# Patient Record
(demographics unavailable — no encounter records)

---

## 2024-10-15 NOTE — HISTORY OF PRESENT ILLNESS
[FreeTextEntry1] : Follow-up for discharge from [facility] for syncope and transient metabolic encephalopathy.  [de-identified] : Patient is a 74 year old female enrolled in the [facility] program with no PMHx. Patient was recently discharged from [facility] for syncope and transient metabolic encephalopathy.    Hospital chart reviewed and copied as per [facility] Discharge Summary: "73 yo F who was brought in from home due to episode of LOC and unresponsiveness. Per pt's daughter who, the pt was sitting at the kitchen table with her son when she slumped to the side and hit the back of her head on the table. Initially in ED she largely unresponsive, She would wiggle bilateral thumbs when asked to by her daughter. As neuro was considering IV thrombolytics Dr. Sena, was able to elicit a more cooperative exam after vigorous stimulation. Pt ultimately opened her eyes, smiled, moved BUE antigravity. Due to rapidly improving exam, without clear, lateralizing deficit, ultimately decided to forgo IV thrombolytics. CTA head/neck without ELVO, CTP negative".    Patient observed via tele visit and is alert and oriented x 3, in no acute distress. Patient states they are feeling well today. Patient states they are eating and drinking well. Patient reports she has been working with PT and has been advised to use a cane for assistance with ambulation. Reports compliance with medications. States she is currently wearing cardiac monitor that is due to be taken off 10/25/24 and will f/u with cardiology afterwards. Patient reports she has pending f/u with neurology- plans to call to make appointment. Denies any cough, fever, chills, abdominal pain, palpitations, nausea, vomiting, diarrhea, lightheadedness, dizziness, shortness of breath, or chest pain.

## 2024-10-15 NOTE — ASSESSMENT
[FreeTextEntry1] : Patient is a 74 year old female enrolled in the [facility] program with no PMHx. Patient was recently discharged from [facility] for syncope and transient metabolic encephalopathy.    Hospital chart reviewed and copied as per [facility] Discharge Summary: "73 yo F who was brought in from home due to episode of LOC and unresponsiveness. Per pt's daughter who, the pt was sitting at the kitchen table with her son when she slumped to the side and hit the back of her head on the table. Initially in ED she largely unresponsive, She would wiggle bilateral thumbs when asked to by her daughter. As neuro was considering IV thrombolytics Dr. Sena, was able to elicit a more cooperative exam after vigorous stimulation. Pt ultimately opened her eyes, smiled, moved BUE antigravity. Due to rapidly improving exam, without clear, lateralizing deficit, ultimately decided to forgo IV thrombolytics. CTA head/neck without ELVO, CTP negative".    Patient observed via tele visit and is alert and oriented x 3, in no acute distress. Patient states they are feeling well today. Patient states they are eating and drinking well. Patient reports she has been working with PT and has been advised to use a cane for assistance with ambulation. Reports compliance with medications. States she is currently wearing cardiac monitor that is due to be taken off 10/25/24 and will f/u with cardiology afterwards. Patient reports she has pending f/u with neurology- plans to call to make appointment. Denies any cough, fever, chills, abdominal pain, palpitations, nausea, vomiting, diarrhea, lightheadedness, dizziness, shortness of breath, or chest pain.

## 2024-10-15 NOTE — PHYSICAL EXAM
[No Acute Distress] : no acute distress [Well Nourished] : well nourished [Well Developed] : well developed [Well-Appearing] : well-appearing [Normal Sclera/Conjunctiva] : normal sclera/conjunctiva [Normal Outer Ear/Nose] : the outer ears and nose were normal in appearance [No Respiratory Distress] : no respiratory distress  [No Edema] : there was no peripheral edema [Non-distended] : non-distended [No Rash] : no rash [Normal Affect] : the affect was normal [Normal Insight/Judgement] : insight and judgment were intact [de-identified] : limited due to tele visit

## 2024-10-15 NOTE — REVIEW OF SYSTEMS
[Fever] : no fever [Chills] : no chills [Fatigue] : no fatigue [Hot Flashes] : no hot flashes [Night Sweats] : no night sweats [Chest Pain] : no chest pain [Palpitations] : no palpitations [Leg Claudication] : no leg claudication [Lower Ext Edema] : no lower extremity edema [Orthopnea] : no orthopnea [Shortness Of Breath] : no shortness of breath [Wheezing] : no wheezing [Cough] : no cough [Dyspnea on Exertion] : no dyspnea on exertion [Abdominal Pain] : no abdominal pain [Nausea] : no nausea [Constipation] : no constipation [Diarrhea] : diarrhea [Vomiting] : no vomiting [Dysuria] : no dysuria [Incontinence] : no incontinence [Hematuria] : no hematuria [Joint Pain] : no joint pain [Muscle Pain] : no muscle pain [Itching] : no itching [Skin Rash] : no skin rash [Dizziness] : dizziness [Unsteady Walking] : ataxia [Negative] : Heme/Lymph

## 2024-10-25 NOTE — END OF VISIT
[FreeTextEntry3] :  All medical record entries made by my scribe were at my, Dr. Faisal Garcia, direction and personally dictated by me. I have reviewed the chart and agree that the record accurately reflects my personal performance of the history, physical exam, assessment and plan. I have also personally directed, reviewed, and agreed with the chart.

## 2024-10-25 NOTE — PHYSICAL EXAM
[Well Developed] : well developed [Well Nourished] : well nourished [No Acute Distress] : no acute distress [Normal Venous Pressure] : normal venous pressure [No Carotid Bruit] : no carotid bruit [Normal S1, S2] : normal S1, S2 [No Murmur] : no murmur [No Rub] : no rub [No Gallop] : no gallop [Clear Lung Fields] : clear lung fields [Good Air Entry] : good air entry [No Respiratory Distress] : no respiratory distress  [Soft] : abdomen soft [Non Tender] : non-tender [No Masses/organomegaly] : no masses/organomegaly [Normal Bowel Sounds] : normal bowel sounds [Normal Gait] : normal gait [No Edema] : no edema [No Cyanosis] : no cyanosis [No Clubbing] : no clubbing [No Varicosities] : no varicosities [No Rash] : no rash [No Skin Lesions] : no skin lesions [Moves all extremities] : moves all extremities [No Focal Deficits] : no focal deficits [Normal Speech] : normal speech [Alert and Oriented] : alert and oriented [Normal memory] : normal memory [General Appearance - Well Developed] : well developed [Normal Appearance] : normal appearance [Well Groomed] : well groomed [General Appearance - Well Nourished] : well nourished [No Deformities] : no deformities [General Appearance - In No Acute Distress] : no acute distress [Normal Conjunctiva] : the conjunctiva exhibited no abnormalities [Eyelids - No Xanthelasma] : the eyelids demonstrated no xanthelasmas [Respiration, Rhythm And Depth] : normal respiratory rhythm and effort [Exaggerated Use Of Accessory Muscles For Inspiration] : no accessory muscle use [Auscultation Breath Sounds / Voice Sounds] : lungs were clear to auscultation bilaterally [Heart Rate And Rhythm] : heart rate and rhythm were normal [Heart Sounds] : normal S1 and S2 [Murmurs] : no murmurs present [Abdomen Soft] : soft [Abdomen Tenderness] : non-tender [Abdomen Mass (___ Cm)] : no abdominal mass palpated [Nail Clubbing] : no clubbing of the fingernails [Cyanosis, Localized] : no localized cyanosis [Petechial Hemorrhages (___cm)] : no petechial hemorrhages [] : no ischemic changes

## 2024-10-25 NOTE — ADDENDUM
[FreeTextEntry1] : Ana HEWITT assisted in documentation on 10/25/2024   acting as a scribe for Dr. Faisal Garcia.

## 2024-10-25 NOTE — HISTORY OF PRESENT ILLNESS
[FreeTextEntry1] : 73 yo F c PMHX arrythmia and palpitations, SBO, here with atypical chest pain- MI was ruled out with 3 sets of negative cardiac enzymes. telemetry demonstrating a lot of PAC's, patient likely has symptomatic palpitations from PAC's. a Coronary CTA was performed and patient has score of 0- very LOW  probability of having coronary disease.  10/25/2024: Patient comes to the office for follow-up after two years. Patient was recently hospitalized for episodes of syncope. Patient was sitting at a table talking to daughter and lost consciousness. Patient does not recall what happened, notes sudden onset, and did not wake up until she reached the hospital. Workup in the hospital did not show any significant abnormalities or stroke. Patient does not remember what happened and comes to my office today for evaluation. On event monitor, patient was found to have an episode of non-sustained VT.     EKG (10/25/2024): Sinus rhythm at 70 bpm  Echo (09/2024): EF of 53%, mildly dilated LA, moderate mitral valve prolapses MRI (09/2024): No acute intracranial pathology  EKG SR 65 bpm PAC event monitor - PAC and PVC CT - 3mm nodule

## 2024-10-25 NOTE — HISTORY OF PRESENT ILLNESS
[FreeTextEntry1] : 75 yo F c PMHX arrythmia and palpitations, SBO, here with atypical chest pain- MI was ruled out with 3 sets of negative cardiac enzymes. telemetry demonstrating a lot of PAC's, patient likely has symptomatic palpitations from PAC's. a Coronary CTA was performed and patient has score of 0- very LOW  probability of having coronary disease.  10/25/2024: Patient comes to the office for follow-up after two years. Patient was recently hospitalized for episodes of syncope. Patient was sitting at a table talking to daughter and lost consciousness. Patient does not recall what happened, notes sudden onset, and did not wake up until she reached the hospital. Workup in the hospital did not show any significant abnormalities or stroke. Patient does not remember what happened and comes to my office today for evaluation. On event monitor, patient was found to have an episode of non-sustained VT.     EKG (10/25/2024): Sinus rhythm at 70 bpm  Echo (09/2024): EF of 53%, mildly dilated LA, moderate mitral valve prolapses MRI (09/2024): No acute intracranial pathology  EKG SR 65 bpm PAC event monitor - PAC and PVC CT - 3mm nodule

## 2024-10-25 NOTE — ASSESSMENT
[FreeTextEntry1] : Palpitations  -improved  - cont BB  Nodular of CT - I informed patient to follow up with pulmonary to evaluate her CT scan.  Syncope - Unknown etiology of syncope at this time.   - Given long period of time for loss of consciousness, it is less likely to be cardiac in nature. However, patient does have mitral valve prolapse. Therefore, would like to rule out malignant mitral valve prolapse syndrome.  - Recommend cardiac catheterization. - MRI.  - ILR to evaluate for abnormal heart rhythm. Especially if patient has possible stroke, he will be able to rule out atrial fibrillation.    Non-sustained VT - Cardiac catheterization to evaluate for any new coronary disease.   - Patient had prior CT that was questionable. However, did not show any significant lesions.

## 2024-11-27 NOTE — ASSESSMENT
[FreeTextEntry1] : Palpitations s/p ILR implant  - Wound as described above. I recommend patient STAR keflex 500mg BID for infection prophylaxis. Picture shared with Dr. Garcia. He would like to see the patient in person to assess. She will go to [address] [place] [address] today as he is there seeing patients.  - Likely hematoma pushing up device. Will likely heal well - patient is wanting device removed. I explained that this can be done but site must be less inflamed and she must complete abx first. Can re-eval at follow up.  - No events on ILR. - The patient is on remote and transmitting.   Non-sustained VT - No new episodes.  - Cardiac catheterization to evaluate for any new coronary disease.   - Patient had prior CT that was questionable. However, did not show any significant lesions.   To follow up 12/6 as scheduled.

## 2024-11-27 NOTE — PROCEDURE
[See Device Printout] : See device printout [de-identified] : FRANCISCA [de-identified] : LINQ II [de-identified] : [id number] [de-identified] : 11/7/2024 [de-identified] : No events

## 2024-11-27 NOTE — HISTORY OF PRESENT ILLNESS
[FreeTextEntry1] : 75 yo F c PMHX arrythmia and palpitations, SBO, here with atypical chest pain- MI was ruled out with 3 sets of negative cardiac enzymes. telemetry demonstrating a lot of PAC's, patient likely has symptomatic palpitations from PAC's. a Coronary CTA was performed and patient has score of 0- very LOW  probability of having coronary disease.  10/25/2024: Patient comes to the office for follow-up after two years. Patient was recently hospitalized for episodes of syncope. Patient was sitting at a table talking to daughter and lost consciousness. Patient does not recall what happened, notes sudden onset, and did not wake up until she reached the hospital. Workup in the hospital did not show any significant abnormalities or stroke. Patient does not remember what happened and comes to my office today for evaluation. On event monitor, patient was found to have an episode of non-sustained VT.    11/21/2024: Patient presents for urgent follow up for WCK s/p ILR implant. She reports swelling and pain. ILR site was bleeding 3 days after implant.   EKG (10/25/2024): Sinus rhythm at 70 bpm  Echo (09/2024): EF of 53%, mildly dilated LA, moderate mitral valve prolapses MRI (09/2024): No acute intracranial pathology  EKG SR 65 bpm PAC event monitor - PAC and PVC CT - 3mm nodule

## 2024-11-27 NOTE — ASSESSMENT
[FreeTextEntry1] : Palpitations s/p ILR implant  - Wound as described above. I recommend patient STAR keflex 500mg BID for infection prophylaxis. Picture shared with Dr. Garcia. He would like to see the patient in person to assess. She will go to [address] [place] [address] today as he is there seeing patients.  - Likely hematoma pushing up device. Will likely heal well - patient is wanting device removed. I explained that this can be done but site must be less inflamed and she must complete abx first. Can re-eval at follow up.  - No events on ILR. - The patient is on remote and transmitting.   Non-sustained VT - No new episodes.  - Cardiac catheterization to evaluate for any new coronary disease.   - Patient had prior CT that was questionable. However, did not show any significant lesions.   To follow up 12/6 as scheduled.

## 2024-11-27 NOTE — PROCEDURE
[See Device Printout] : See device printout [de-identified] : FRANCISCA [de-identified] : LINQ II [de-identified] : [id number] [de-identified] : 11/7/2024 [de-identified] : No events

## 2024-11-27 NOTE — PHYSICAL EXAM
[Well Developed] : well developed [Well Nourished] : well nourished [No Acute Distress] : no acute distress [Normal Venous Pressure] : normal venous pressure [No Carotid Bruit] : no carotid bruit [Normal S1, S2] : normal S1, S2 [No Murmur] : no murmur [No Rub] : no rub [No Gallop] : no gallop [Clear Lung Fields] : clear lung fields [Good Air Entry] : good air entry [No Respiratory Distress] : no respiratory distress  [Soft] : abdomen soft [Non Tender] : non-tender [No Masses/organomegaly] : no masses/organomegaly [Normal Bowel Sounds] : normal bowel sounds [Normal Gait] : normal gait [No Edema] : no edema [No Cyanosis] : no cyanosis [No Clubbing] : no clubbing [No Varicosities] : no varicosities [No Rash] : no rash [No Skin Lesions] : no skin lesions [Moves all extremities] : moves all extremities [No Focal Deficits] : no focal deficits [Normal Speech] : normal speech [Alert and Oriented] : alert and oriented [Normal memory] : normal memory [General Appearance - Well Developed] : well developed [Normal Appearance] : normal appearance [Well Groomed] : well groomed [General Appearance - Well Nourished] : well nourished [No Deformities] : no deformities [General Appearance - In No Acute Distress] : no acute distress [Heart Rate And Rhythm] : heart rate and rhythm were normal [Heart Sounds] : normal S1 and S2 [Murmurs] : no murmurs present [Respiration, Rhythm And Depth] : normal respiratory rhythm and effort [Exaggerated Use Of Accessory Muscles For Inspiration] : no accessory muscle use [Auscultation Breath Sounds / Voice Sounds] : lungs were clear to auscultation bilaterally [Abdomen Soft] : soft [Abdomen Tenderness] : non-tender [Abdomen Mass (___ Cm)] : no abdominal mass palpated [Nail Clubbing] : no clubbing of the fingernails [Cyanosis, Localized] : no localized cyanosis [Petechial Hemorrhages (___cm)] : no petechial hemorrhages [] : no ischemic changes [Normal Conjunctiva] : the conjunctiva exhibited no abnormalities [Eyelids - No Xanthelasma] : the eyelids demonstrated no xanthelasmas [FreeTextEntry2] : Scab over wound with surrounding redness, swelling around incision site. No erythema. Soft to touch. Likely hematoma pushing up device.  [FreeTextEntry1] : Left parasternal

## 2024-12-06 NOTE — HISTORY OF PRESENT ILLNESS
[FreeTextEntry1] : 73 yo F c PMHX arrythmia and palpitations, SBO, here with atypical chest pain- MI was ruled out with 3 sets of negative cardiac enzymes. telemetry demonstrating a lot of PAC's, patient likely has symptomatic palpitations from PAC's. a Coronary CTA was performed and patient has score of 0- very LOW  probability of having coronary disease.  10/25/2024: Patient comes to the office for follow-up after two years. Patient was recently hospitalized for episodes of syncope. Patient was sitting at a table talking to daughter and lost consciousness. Patient does not recall what happened, notes sudden onset, and did not wake up until she reached the hospital. Workup in the hospital did not show any significant abnormalities or stroke. Patient does not remember what happened and comes to my office today for evaluation. On event monitor, patient was found to have an episode of non-sustained VT.    11/21/2024: Patient presents for urgent follow up for WCK s/p ILR implant. She reports swelling and pain. ILR site was bleeding 3 days after implant.  12/06/2024: She presents today for wound recheck. The patient states that the incision has been bleeding and has it covered with a dressing.  EKG 12/06/2024: NSR at 68 bpm, normal ECG. EKG (10/25/2024): Sinus rhythm at 70 bpm  Echo (09/2024): EF of 53%, mildly dilated LA, moderate mitral valve prolapses MRI (09/2024): No acute intracranial pathology  EKG SR 65 bpm PAC event monitor - PAC and PVC CT - 3mm nodule

## 2024-12-06 NOTE — PHYSICAL EXAM
[Bleeding] : active bleeding [Foul Odor] : no foul smell [Purulent Drainage] : no purulent drainage [Serous Drainage] : no serous drainage [Erythema] : not erythematous [Warm] : not warm [Tender] : not tender [FreeTextEntry1] : parasternal

## 2024-12-06 NOTE — ASSESSMENT
[FreeTextEntry1] : 74-year-old female s/p ILR implantation of syncope, presents today for wound recheck. The patient states that the incision has been bleeding and has it covered with a dressing. Bleeding underneath the dressing, some fresh blood and some crusty. No erythema, no swelling and no pain on palpation. Picture taken by Jailene and send to Dr. Garcia. As per Dr. Garcia to continue to monitor. She finished the course of antibiotics. Advised to call the office if fever, chills and excessive bleeding occur. Recommended to apply Bacitracin ointment at the incision site.  Will follow-up in one week.

## 2025-02-11 NOTE — HISTORY OF PRESENT ILLNESS
[de-identified] : Patient is a 74-year-old female here for reevaluation of left knee.  Patient has history of left knee osteoarthritis.  Patient states last cortisone injection helped her well pain started coming back a couple months ago with no injury or trauma.  Pain worsens with activities  Left knee exam: No effusion, ecchymosis, erythema tenderness palpation the medial joint line, good range of motion, good strength, ligaments intact, no gross instability, neurovascular intact  Reviewed prior left knee x-rays in detail with patient showing moderate to advanced osteoarthritis left knee.  Plan is for repeat cortisone injection.  Dexamethasone and Lidocaine      Anesthesia: ethyl chloride sprayed topically.    Dexamethasone 10mg/mL 2 cc   Lidocaine 1% 3cc         Medication was injected in the LT knee after verbal consent using sterile preparation and technique. The risks, benefits, and alternatives to cortisone injection were explained in full to the patient. Risks outlined include but are not limited to infection, sepsis, bleeding, scarring, skin discoloration, temporary increase in pain, syncopal episode, failure to resolve symptoms, allergic reaction, symptom recurrence, and elevation of blood sugar in diabetics. Patient understood the risks. All questions were answered. After discussion of options, patient requested an injection. Oral informed consent was obtained and sterile prep was done of the injection site. Sterile technique was utilized for the procedure including the preparation of the solutions used for the injection. Patient tolerated the procedure well. Advised to ice the injection site this evening.  Follow-up 4 to 6 months/as needed

## 2025-03-10 NOTE — PROCEDURE
[See Device Printout] : See device printout [de-identified] : FRANCISCA [de-identified] : LINQ II [de-identified] : [id number] [de-identified] : 11/7/2024 [de-identified] : No events  PVCs <1% R = 0.27mV

## 2025-03-10 NOTE — PHYSICAL EXAM
[General Appearance - Well Developed] : well developed [Normal Appearance] : normal appearance [Well Groomed] : well groomed [General Appearance - Well Nourished] : well nourished [No Deformities] : no deformities [General Appearance - In No Acute Distress] : no acute distress [Heart Rate And Rhythm] : heart rate and rhythm were normal [] : no respiratory distress [Clean] : clean [Dry] : dry [Well-Healed] : well-healed [FreeTextEntry1] : parasternal [Abdomen Soft] : soft [Nail Clubbing] : no clubbing of the fingernails [Cyanosis, Localized] : no localized cyanosis

## 2025-03-10 NOTE — CARDIOLOGY SUMMARY
[de-identified] :  03/10/2025: sinus rhythm 80 bpm, non-spec ST abnormality [de-identified] : cMRI 2/6/2025 1. Normal left ventricular size. Normal left ventricular wall thickness.  Normal left ventricular systolic function (LVEF 55%). No regional wall  motion abnormalities. 2. Faint myocardial scar involving the mid-myocardial aspect of the basal  inferior and basal inferolateral walls. This is consistent with a  non-ischemic cardiomyopathy and has been reported in the context of  mitral valve prolapse. 3. Normal right ventricular size. Normal right ventricular systolic  function (RVEF 55%). 4. Mild left atrial enlargement. Probable moderate mitral regurgitation  (regurgitant volume = 26 ml; regurgitant fraction = 30% using left and  right ventricular stroke volume differential). Mitral valve prolapse  (bi-leaflet involvement). 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

## 2025-03-10 NOTE — ASSESSMENT
[FreeTextEntry1] : Syncope s/p ILR implant  - Device interrogation normal. I interrogated and reprogrammed the device as described above.  - No events.  - The patient is on remote and transmitting.   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 - Episode of syncope that preceded ILR implant likely not cardiac in nature. Patient syncopized and was "out for 3 hours". Seems more likely neurological or due to hypotension.  - 2 instances of NSVT seen on ILR - 5 beats @ 163 bpm and 11 beats @ 106 bpm. No new episodes seen on ILR. Can consider decreasing tachy zone on ILR (currently 16 beats @ 158 bpm) if concerned for shorter NSVT. - Moderate mitral regurgitation seen on MRI. Would likely recommend POOJA to evaluate better as this could be the cause of patient's symptoms. Will see patient with Dr. Garcia next week and discuss.   RTO in 1 week on 3/20 when Dr. Garcia is here and will see patient with him. Will create chart note that day.

## 2025-03-10 NOTE — HISTORY OF PRESENT ILLNESS
[FreeTextEntry1] : 75 yo F c PMHX arrythmia and palpitations, SBO, here with atypical chest pain- MI was ruled out with 3 sets of negative cardiac enzymes. telemetry demonstrating a lot of PAC's, patient likely has symptomatic palpitations from PAC's. a Coronary CTA was performed and patient has score of 0- very LOW  probability of having coronary disease.  10/25/2024: Patient comes to the office for follow-up after two years. Patient was recently hospitalized for episodes of syncope. Patient was sitting at a table talking to daughter and lost consciousness. Patient does not recall what happened, notes sudden onset, and did not wake up until she reached the hospital. Workup in the hospital did not show any significant abnormalities or stroke. Patient does not remember what happened and comes to my office today for evaluation. On event monitor, patient was found to have an episode of non-sustained VT.    11/21/2024: Patient presents for urgent follow up for WCK s/p ILR implant. She reports swelling and pain. ILR site was bleeding 3 days after implant.  12/06/2024: She presents today for wound recheck. The patient states that the incision has been bleeding and has it covered with a dressing.  3/10/2025: Here for follow up after cardiac MRI. She saw Dr. Cooper a couple of weeks ago and he told her she is going to need an ICD. She has had increasing dizziness and SOB, even at rest. Denies new syncope.   EKG 12/06/2024: NSR at 68 bpm, normal ECG. EKG (10/25/2024): Sinus rhythm at 70 bpm  Echo (09/2024): EF of 53%, mildly dilated LA, moderate mitral valve prolapses MRI (09/2024): No acute intracranial pathology  EKG SR 65 bpm PAC event monitor - PAC and PVC CT - 3mm nodule

## 2025-03-20 NOTE — ASSESSMENT
[FreeTextEntry1] :   Plan: - Imaging reviewed TTE reviewed - Will need POOJA for better valve eval - Cont to follow-up with cardio and EP  - RTO after testing

## 2025-03-20 NOTE — PHYSICAL EXAM
[General Appearance - Alert] : alert [General Appearance - In No Acute Distress] : in no acute distress [Sclera] : the sclera and conjunctiva were normal [PERRL With Normal Accommodation] : pupils were equal in size, round, and reactive to light [Extraocular Movements] : extraocular movements were intact [Outer Ear] : the ears and nose were normal in appearance [Oropharynx] : the oropharynx was normal [Neck Appearance] : the appearance of the neck was normal [Neck Cervical Mass (___cm)] : no neck mass was observed [Jugular Venous Distention Increased] : there was no jugular-venous distention [Thyroid Diffuse Enlargement] : the thyroid was not enlarged [Thyroid Nodule] : there were no palpable thyroid nodules [Auscultation Breath Sounds / Voice Sounds] : lungs were clear to auscultation bilaterally [Heart Rate And Rhythm] : heart rate was normal and rhythm regular [Heart Sounds] : normal S1 and S2 [Heart Sounds Gallop] : no gallops [Heart Sounds Pericardial Friction Rub] : no pericardial rub [FreeTextEntry1] : Mild distention  [Abnormal Walk] : normal gait [Nail Clubbing] : no clubbing  or cyanosis of the fingernails [Musculoskeletal - Swelling] : no joint swelling seen [Motor Tone] : muscle strength and tone were normal [Skin Color & Pigmentation] : normal skin color and pigmentation [Skin Turgor] : normal skin turgor [] : no rash [Deep Tendon Reflexes (DTR)] : deep tendon reflexes were 2+ and symmetric [Sensation] : the sensory exam was normal to light touch and pinprick [No Focal Deficits] : no focal deficits [Oriented To Time, Place, And Person] : oriented to person, place, and time [Impaired Insight] : insight and judgment were intact [Affect] : the affect was normal

## 2025-03-20 NOTE — HISTORY OF PRESENT ILLNESS
[FreeTextEntry1] : Ms. WATKINS 74 year F, former smoker, here today for evaluation of their MVP w MR. PMHx of arrythmia and palpitations. Symptoms include SOB and abdominal bloating. All questions and concerns were addressed with the patient. Pre-op planning was discussed with the patient, including dental clearance. Patient was educated on the risks and alternatives to surgery. STS was calculated and discussed with the pt, as well of need for MCOT post procedure and risk for PPM.   Patient had a syncopal fall and went to the hospital. Patient had LHC and no stents were placed. Patient had ILR placed and discharged. Patient has SOB and has to sleep on three pillows. Patient also reports stomach bloating and early satiety (will see cardio 4:30pm today). Patient arrives for eval of MR and surgical discussion. Patient is not on a diuretic.   Used to be active, but isn't since the fall NYHA class: III Pt lives home with daughter and family Former smoker quit 52 years ago   Their healthcare team includes the following PMD: Dr. Avalos Cardio: Dr. Jim Pulmonary: Earl   5 Meter walk 1. 5.5 2. 5.7 3. 5.4   6 minute walk: 800ft and had SOB   Frailty: Right: 28.4, 29.1, 28.6 Left: 25.5, 27.0, 26.4

## 2025-05-01 NOTE — CARDIOLOGY SUMMARY
[de-identified] :  03/10/2025: sinus rhythm 80 bpm, non-spec ST abnormality [de-identified] : cMRI 2/6/2025 1. Normal left ventricular size. Normal left ventricular wall thickness.  Normal left ventricular systolic function (LVEF 55%). No regional wall  motion abnormalities. 2. Faint myocardial scar involving the mid-myocardial aspect of the basal  inferior and basal inferolateral walls. This is consistent with a  non-ischemic cardiomyopathy and has been reported in the context of  mitral valve prolapse. 3. Normal right ventricular size. Normal right ventricular systolic  function (RVEF 55%). 4. Mild left atrial enlargement. Probable moderate mitral regurgitation  (regurgitant volume = 26 ml; regurgitant fraction = 30% using left and  right ventricular stroke volume differential). Mitral valve prolapse  (bi-leaflet involvement). 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

## 2025-05-01 NOTE — PROCEDURE
[See Device Printout] : See device printout [de-identified] : FRANCISCA [de-identified] : LINQ II [de-identified] : [id number] [de-identified] : 11/7/2024 [de-identified] : Good [de-identified] : No events  PVCs 1% R = 0.26mV

## 2025-05-01 NOTE — PHYSICAL EXAM
[Sclera] : the sclera and conjunctiva were normal [Neck Appearance] : the appearance of the neck was normal [Respiration, Rhythm And Depth] : normal respiratory rhythm and effort [Heart Rate And Rhythm] : heart rate was normal and rhythm regular [Bowel Sounds] : normal bowel sounds [Abdomen Soft] : soft [No CVA Tenderness] : no ~M costovertebral angle tenderness [Involuntary Movements] : no involuntary movements were seen [Skin Color & Pigmentation] : normal skin color and pigmentation [Skin Turgor] : normal skin turgor [No Focal Deficits] : no focal deficits [Oriented To Time, Place, And Person] : oriented to person, place, and time [Impaired Insight] : insight and judgment were intact

## 2025-05-01 NOTE — ASSESSMENT
[FreeTextEntry1] :  #MR SOB exerting herself with abdominal bloating Will see Dr. Garcia one more time before surgery offered Symptoms out of proportion from POOJA results (moderate MR) R/o other reasons for symptoms then plan MR repair  Patient seen and examined History and physical as per above Briefly, 74 year-old woman severe MR and severe SOB Will see Dr. Garcia and likely need MVR  I, Dr. Thakkar, saw, examined, and reviewed the diagnostic images with the patient and agree with my nurse practitioners clinic note, physical exam findings, and treatment plan.  Janak Thakkar MD

## 2025-05-01 NOTE — HISTORY OF PRESENT ILLNESS
[FreeTextEntry1] : 73 yo F c PMHX arrythmia and palpitations, SBO, here with atypical chest pain- MI was ruled out with 3 sets of negative cardiac enzymes. telemetry demonstrating a lot of PAC's, patient likely has symptomatic palpitations from PAC's. a Coronary CTA was performed and patient has score of 0- very LOW  probability of having coronary disease.  10/25/2024: Patient comes to the office for follow-up after two years. Patient was recently hospitalized for episodes of syncope. Patient was sitting at a table talking to daughter and lost consciousness. Patient does not recall what happened, notes sudden onset, and did not wake up until she reached the hospital. Workup in the hospital did not show any significant abnormalities or stroke. Patient does not remember what happened and comes to my office today for evaluation. On event monitor, patient was found to have an episode of non-sustained VT.    11/21/2024: Patient presents for urgent follow up for WCK s/p ILR implant. She reports swelling and pain. ILR site was bleeding 3 days after implant.  12/06/2024: She presents today for wound recheck. The patient states that the incision has been bleeding and has it covered with a dressing.  3/10/2025: Here for follow up after cardiac MRI. She saw Dr. Cooper a couple of weeks ago and he told her she is going to need an ICD. She has had increasing dizziness and SOB, even at rest. Denies new syncope.   5/1/2025: Here for follow up. Pending mitral valve replacement surgery. No change in original symptoms of dizziness and SOB.   EKG 12/06/2024: NSR at 68 bpm, normal ECG. EKG (10/25/2024): Sinus rhythm at 70 bpm  Echo (09/2024): EF of 53%, mildly dilated LA, moderate mitral valve prolapses MRI (09/2024): No acute intracranial pathology  EKG SR 65 bpm PAC event monitor - PAC and PVC CT - 3mm nodule

## 2025-05-01 NOTE — PHYSICAL EXAM
[Sclera] : the sclera and conjunctiva were normal [PERRL With Normal Accommodation] : pupils were equal in size, round, and reactive to light [Extraocular Movements] : extraocular movements were intact [Neck Appearance] : the appearance of the neck was normal [Neck Cervical Mass (___cm)] : no neck mass was observed [Jugular Venous Distention Increased] : there was no jugular-venous distention [Thyroid Diffuse Enlargement] : the thyroid was not enlarged [Thyroid Nodule] : there were no palpable thyroid nodules [Auscultation Breath Sounds / Voice Sounds] : lungs were clear to auscultation bilaterally [Heart Rate And Rhythm] : heart rate was normal and rhythm regular [Heart Sounds] : normal S1 and S2 [Heart Sounds Gallop] : no gallops [Heart Sounds Pericardial Friction Rub] : no pericardial rub [Abnormal Walk] : normal gait [Nail Clubbing] : no clubbing  or cyanosis of the fingernails [Musculoskeletal - Swelling] : no joint swelling seen [Motor Tone] : muscle strength and tone were normal [Skin Color & Pigmentation] : normal skin color and pigmentation [Skin Turgor] : normal skin turgor [] : no rash [Deep Tendon Reflexes (DTR)] : deep tendon reflexes were 2+ and symmetric [Sensation] : the sensory exam was normal to light touch and pinprick [No Focal Deficits] : no focal deficits [Oriented To Time, Place, And Person] : oriented to person, place, and time [Impaired Insight] : insight and judgment were intact [Affect] : the affect was normal [FreeTextEntry1] : Mild distention

## 2025-05-01 NOTE — HISTORY OF PRESENT ILLNESS
[FreeTextEntry1] : Ms. WATKINS 74 year F, former smoker, here today for evaluation of their MVP w MR. PMHx of arrythmia and palpitations and PACs. Symptoms include SOB and abdominal bloating. All questions and concerns were addressed with the patient. Pre-op planning was discussed with the patient, including dental clearance. Patient was educated on the risks and alternatives to surgery. STS was calculated and discussed with the pt, as well of need for MCOT post procedure and risk for PPM. Patient had a syncopal fall and went to the hospital. Patient had LHC and no stents were placed. Patient had ILR placed and discharged. Patient has SOB and has to sleep on three pillows. Patient also reports stomach bloating and early satiety. Patient arrives for eval of MR and surgical discussion. Patient is not on a diuretic. Dizzy lightheaded and uses four pillows now. Since syncope it has been worse. Abdominal swelling and chest pressure. Patient also reports being followed by EP and has an ILR implant.  Used to be active, but isn't since the fall NYHA class: III Pt lives home with daughter and family Former smoker quit 52 years ago  Their healthcare team includes the following PMD: Dr. Avalos Cardio: Dr. Jim Pulmonary: Earl  5 Meter walk 1. 5.5 2. 5.7 3. 5.4  6 minute walk: 800ft and had SOB  Frailty: Right: 28.4, 29.1, 28.6 Left: 25.5, 27.0, 26.4

## 2025-05-01 NOTE — ASSESSMENT
[FreeTextEntry1] : Syncope s/p ILR implant  - Device interrogation normal. I interrogated and reprogrammed the device as described above.  - No events.  - The patient is on remote and transmitting.   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 - Episode of syncope that preceded ILR implant likely not cardiac in nature. Patient syncopized and was "out for 3 hours". Seems more likely neurological or due to hypotension.  - 2 instances of NSVT seen on ILR - 5 beats @ 163 bpm and 11 beats @ 106 bpm. No new episodes seen on ILR. Can consider decreasing tachy zone on ILR (currently 16 beats @ 158 bpm) if concerned for shorter NSVT. - Moderate mitral regurgitation seen on POOJA. Patient seen and examined with Dr. Garcia. Mitral valve replacement recommended due to symptoms and mitral annular dysjunction.   Follow up with Dr. Thakkar.  RTO in 4-6 months

## 2025-05-08 NOTE — ASSESSMENT
[FreeTextEntry1] : Ms. WATKINS 74 year F, former smoker, here today for evaluation of their MVP w MR.   POOJA And Cath reviewed  Pt need carotids, PFT, CT head, Neuro clearance, and dental  F/U after for review surgical planning   Patient seen and examined History and physical as per above Briefly, 74 year-old woman with severe MR Will return to office once has neuro clearance  I, Dr. Thakkar, saw, examined, and reviewed the diagnostic images with the patient and agree with my nurse practitioners clinic note, physical exam findings, and treatment plan.  Janak Thakkar MD

## 2025-05-08 NOTE — HISTORY OF PRESENT ILLNESS
[FreeTextEntry1] : Ms. WATKINS 74 year F, former smoker, here today for evaluation of their MVP w MR. PMHx of arrythmia and palpitations. Symptoms include SOB and abdominal bloating. All questions and concerns were addressed with the patient. Pre-op planning was discussed with the patient, including dental clearance. Patient was educated on the risks and alternatives to surgery. STS was calculated and discussed with the pt, as well of need for MCOT post procedure and risk for PPM.  Patient had a syncopal fall and went to the hospital. Patient had LHC and no stents were placed. Patient had ILR placed and discharged. Patient has SOB and has to sleep on three pillows. Patient also reports stomach bloating and early satiety (will see cardio 4:30pm today). Patient arrives for eval of MR and surgical discussion. Patient is not on a diuretic.  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 Avalos Cardio: Dr. Jim Pulmonary: Earl

## 2025-05-15 NOTE — ASSESSMENT
[FreeTextEntry1] : Impression is of syncope secondary to cardiogenic causes. She has already had a workup of the brain at [facility], which included a brain CT scan, brain MRI, and an EEG. Other than mild chronic ischemic changes she is normal. She does not need to have the workup repeated because she does not have any recurrence of syncope. There was no neurologic findings at this time.    Total clinician time spent today on the patient is 45 minutes including preparing to see the patient, obtaining and/or reviewing and confirming history, performing medically necessary and appropriate examination, counseling and educating the patient and/or family, documenting clinical information in the EHR and communicating and/or referring to other healthcare professionals.   Entered by Kika Billingsley acting as scribe for Dr. Mohr.     The documentation recorded by the scribe, in my presence, accurately reflects the service I personally performed, and the decisions made by me with my edits as appropriate. Billy Mohr MD, FAAN, FACP Diplomate American Board of Psychiatry & Neurology.  No

## 2025-05-22 NOTE — ASSESSMENT
[FreeTextEntry1] : Ms. WATKINS 74 year F, former smoker, here today for evaluation of their MVP w MR.   POOJA And Cath, carotids, PFT, CT head, Neuro clearance done  Pending Dental next week Needs PAST Plan for MV replacement and ARMANDO clip  6/3/2025   Hold ASA 81 x 5 days prior

## 2025-05-22 NOTE — HISTORY OF PRESENT ILLNESS
[FreeTextEntry1] : Ms. WATKINS 74 year F, former smoker, here today for evaluation of their MVP w MR. PMHx of arrythmia, NSVT and palpitations. Symptoms include SOB and abdominal bloating. All questions and concerns were addressed with the patient. Pre-op planning was discussed with the patient, including dental clearance. Patient was educated on the risks and alternatives to surgery. STS was calculated and discussed with the pt, as well of need for MCOT post procedure and risk for PPM.  Patient had a syncopal fall and went to the hospital. Patient had LHC and no stents were placed. Patient had ILR placed and discharged. Patient has SOB and has to sleep on three pillows.Patient arrives for eval of MR and surgical discussion. Patient is not on a diuretic.  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 Avalos Cardio: Dr. Jim Pulmonary: Earl

## 2025-07-18 NOTE — COUNSELING
[FreeTextEntry1] : Ms. SHAIKHMO 74 year status post MVR. This is a follow up visit with the patient. During the post op visit all cardiac and sternal precautions were reviewed with the patient. Incision care was reviewed. Education regarding nutrition was also reviewed with the patient, to maintain a low salt diet. On arrival patient denies fever, chills nausea, and vomiting. Denies SOB or palpitation. All questions and concerns were addressed. Patient educated on diet and salt restrictions. Medications were reviewed with the patient, and education for cardiac surgery patient must continue aspirin, and statin post-operatively until re-evaluated by patients cardiologist. Patient was instructed to follow up with their cardiologist as appropriate for long term management.

## 2025-07-18 NOTE — ASSESSMENT
[FreeTextEntry1] : 74-year-old female with nonobstructive CAD, atrial fibrillation (not on  anticoagulation), hypertension, and moderate-to-severe mitral regurgitation  requiring valve replacement evaluated by Dr. Thakkar was admitted with a  one-month history of worsening chest pain, shortness of breath, lower extremity  edema, and lightheadedness. After medical optimization, on 06/13/25, she  underwent surgical myocardial revascularization. Post-operatively, the patient  developed recurrent atrial fibrillation and converted to sinus rhythm  pharmacologically. She was anticoagulated with Eliquis.  She also had  experienced periods of dizziness and was unable to tolerate a b blocker.  She  was treated with Midodrine and her symptoms improved.  She also complained of  swelling of her right ankle during this hospitalization and had a lower  extremity duplex and X-ray that were negative for DVT and fracture.  The ankle  edema resolved prior to discharge.  Her hospital course was prolonged due to  her insurance company denying her authorization for short term rehab.  The  decision was appealed and was eventually overturned.  Hence, she was discharged  to [facility] in stable condition on POD # 11.    Pt was educated on the importance of attending cardiac rehab post op and was  given materials re cardiac rehab program.  She was instructed to inquire  further upon seeing her cardiologist.   Doing well Incisions healing well Recent DC from [facility]  Denies SOB, CP, Palpitations   F/U cardio F/U CTS 2 weeks